# Patient Record
Sex: FEMALE | Race: WHITE | NOT HISPANIC OR LATINO | Employment: FULL TIME | URBAN - METROPOLITAN AREA
[De-identification: names, ages, dates, MRNs, and addresses within clinical notes are randomized per-mention and may not be internally consistent; named-entity substitution may affect disease eponyms.]

---

## 2017-07-11 ENCOUNTER — NURSE TRIAGE (OUTPATIENT)
Dept: NURSING | Facility: CLINIC | Age: 62
End: 2017-07-11

## 2017-07-11 ENCOUNTER — RADIANT APPOINTMENT (OUTPATIENT)
Dept: GENERAL RADIOLOGY | Facility: CLINIC | Age: 62
End: 2017-07-11
Attending: PHYSICIAN ASSISTANT
Payer: COMMERCIAL

## 2017-07-11 ENCOUNTER — OFFICE VISIT (OUTPATIENT)
Dept: URGENT CARE | Facility: URGENT CARE | Age: 62
End: 2017-07-11
Payer: COMMERCIAL

## 2017-07-11 VITALS
BODY MASS INDEX: 22.1 KG/M2 | HEART RATE: 92 BPM | TEMPERATURE: 97.2 F | SYSTOLIC BLOOD PRESSURE: 143 MMHG | DIASTOLIC BLOOD PRESSURE: 84 MMHG | WEIGHT: 139 LBS

## 2017-07-11 DIAGNOSIS — S99.922A INJURY OF LEFT FOOT, INITIAL ENCOUNTER: ICD-10-CM

## 2017-07-11 DIAGNOSIS — S92.515A CLOSED NONDISPLACED FRACTURE OF PROXIMAL PHALANX OF LESSER TOE OF LEFT FOOT, INITIAL ENCOUNTER: Primary | ICD-10-CM

## 2017-07-11 PROBLEM — J44.9 COPD (CHRONIC OBSTRUCTIVE PULMONARY DISEASE) (H): Status: ACTIVE | Noted: 2017-07-11

## 2017-07-11 PROCEDURE — 99203 OFFICE O/P NEW LOW 30 MIN: CPT | Performed by: PHYSICIAN ASSISTANT

## 2017-07-11 PROCEDURE — 73630 X-RAY EXAM OF FOOT: CPT | Mod: LT

## 2017-07-11 RX ORDER — NAPROXEN 500 MG/1
500 TABLET ORAL 2 TIMES DAILY PRN
Qty: 30 TABLET | Refills: 1 | Status: SHIPPED | OUTPATIENT
Start: 2017-07-11

## 2017-07-11 NOTE — PROGRESS NOTES
SUBJECTIVE:                                                      MURPHY Painting is a 61 year old female who presents to clinic today for the following health issues:  Musculoskeletal problem/pain    Duration: earlier today    Description  Location: left foot, 5th toe    Intensity:  moderate    Accompanying signs and symptoms: swelling and bruising.  No radicular pain, numbness, tingling or weakness.  No redness, drainage or fevers.      History  Previous similar problem: no   Previous evaluation:  none    Precipitating or alleviating factors:  Trauma or overuse: YES- foot got caught on some lawn edging today.    Aggravating factors include: standing and walking, pressure.  Relieved with rest.    Therapies tried and outcome: Ibuprofen with minimal relief.      Reviewed PMH, FMH and SOH.  Patient Active Problem List   Diagnosis     COPD (chronic obstructive pulmonary disease) (H)     Current Outpatient Prescriptions   Medication Sig Dispense Refill     Allergies   Allergen Reactions     New Medication Allergy      Unknown name. Anti depressent/anxiety       ROS  All other ROS are negative.    Physical Exam   Constitutional: She is oriented to person, place, and time and well-developed, well-nourished, and in no distress. No distress.   Musculoskeletal:        Left ankle: Normal. She exhibits normal range of motion, no swelling, no ecchymosis and no deformity. No tenderness. Achilles tendon normal.        Left foot: There is tenderness, bony tenderness (localized to the 5th MTP joint with ecchymosis. No bony stepoffs.) and swelling. There is normal range of motion, normal capillary refill, no crepitus, no deformity and no laceration.   Neurological: She is alert and oriented to person, place, and time. She has normal sensation, normal strength, normal reflexes and intact cranial nerves. Gait abnormal.   Skin: Skin is warm, dry and intact.   Distal pulses are 2+ and symmetric.  No peripheral edema.   Nursing  note and vitals reviewed.  3V of L foot:  Fracture of the proximal phalanx of the fifth toe.  There is deformity of the 5th metatarsal head from previous surgery.  No dislocations.  No soft tissue swelling or masses.  Will send for overread.        Assessment/Plan:   Closed nondisplaced fracture of proximal phalanx of lesser toe of left foot, initial encounter  Fracture of the 5th proximal phalanx present.  Toe was buddytaped to the adjacent toe and foot was placed in post op shoe.  Recommend RICE and will give naproxen prn pain.  Will also send to orthopedics for further evaluation and management.  Recheck in clinic if symptoms worsen or if symptoms do not improve.   -     XR Foot Left G/E 3 Views  -     ORTHO  REFERRAL  -     naproxen (NAPROSYN) 500 MG tablet; Take 1 tablet (500 mg) by mouth 2 times daily as needed for moderate pain (with food)    Injury of left foot, initial encounter      Jayna See BRANDEE Daniels

## 2017-07-11 NOTE — MR AVS SNAPSHOT
After Visit Summary   7/11/2017    Lorraine Painting    MRN: [id number]           Patient Information     Date Of Birth          1955        Visit Information        Provider Department      7/11/2017 6:25 PM Jayna Daniels PA-C [facility]        Today's Diagnoses     Injury of left foot, initial encounter    -  1       Follow-ups after your visit        Additional Services     ORTHO  REFERRAL       [facility] is referring you to the Orthopedic  Services at [facility].       The  Representative will assist you in the coordination of your Orthopedic and Musculoskeletal Care as prescribed by your physician.    The  Representative will call you within 1 business day to help schedule your appointment, or you may contact the  Representative at:    All areas ~ [phone]     Type of Referral : [facility]       Timeframe requested: 1 - 2 days    Coverage of these services is subject to the terms and limitations of your health insurance plan.  Please call member services at your health plan with any benefit or coverage questions.      If X-rays, CT or MRI's have been performed, please contact the facility where they were done to arrange for , prior to your scheduled appointment.  Please bring this referral request to your appointment and present it to your specialist.                  Who to contact     If you have questions or need follow up information about today's clinic visit or your schedule please contact [facility] directly at [phone].  Normal or non-critical lab and imaging results will be communicated to you by MyChart, letter or phone within 4 business days after the clinic has received the results. If you do not hear from us within 7 days, please contact the clinic through MyChart or phone. If you have a critical or abnormal lab result, we  "will notify you by phone as soon as possible.  Submit refill requests through [employer] or call your pharmacy and they will forward the refill request to us. Please allow 3 business days for your refill to be completed.          Additional Information About Your Visit        [employer]art Information     [employer] lets you send messages to your doctor, view your test results, renew your prescriptions, schedule appointments and more. To sign up, go to [email or web address] . Click on \"Log in\" on the left side of the screen, which will take you to the Welcome page. Then click on \"Sign up Now\" on the right side of the page.     You will be asked to enter the access code listed below, as well as some personal information. Please follow the directions to create your username and password.     Your access code is: [email or web address]  Expires: 10/9/2017  7:09 PM     Your access code will  in 90 days. If you need help or a new code, please call your [place] clinic or [phone].        Care EveryWhere ID     This is your Care EveryWhere ID. This could be used by other organizations to access your [place] medical records  [id number]        Your Vitals Were     Pulse Temperature BMI (Body Mass Index)             92 97.2  F (36.2  C) (Oral) 22.1 kg/m2          Blood Pressure from Last 3 Encounters:   17 143/84   11/29/10 137/89    Weight from Last 3 Encounters:   17 139 lb (63 kg)   11/29/10 124 lb 12.8 oz (56.6 kg)              We Performed the Following     ORTHO  REFERRAL     XR Foot Left G/E 3 Views          Today's Medication Changes          These changes are accurate as of: 17  7:09 PM.  If you have any questions, ask your nurse or doctor.               Start taking these medicines.        Dose/Directions    naproxen 500 MG tablet   Commonly known as:  NAPROSYN   Used for:  Injury of left foot, initial encounter   Started by:  Jayna Daniels PA-C        Dose:  500 mg   Take 1 tablet (500 mg) by " mouth 2 times daily as needed for moderate pain (with food)   Quantity:  30 tablet   Refills:  1            Where to get your medicines      These medications were sent to [facility] [address], [address]  [address] [address] [facility], [facility] [address], [facility] [address]     Phone:  [phone]     naproxen 500 MG tablet                Primary Care Provider    None Specified       No primary provider on file.        Equal Access to Services     [facility]: Hadii aad ku hadasho Soomaali, waaxda luqadaha, qaybta kaalmada adeegyada, waxay sanjayin haygwendolynn bladimir sandhukarrileonidas gomes . So [facility] [phone].    ATENCIÓN: Si habla español, tiene a stahl disposición servicios gratuitos de asistencia lingüística. Llame al [phone].    We comply with applicable federal civil rights laws and [place] laws. We do not discriminate on the basis of race, color, national origin, age, disability sex, sexual orientation or gender identity.            Thank you!     Thank you for choosing [facility]  for your care. Our goal is always to provide you with excellent care. Hearing back from our patients is one way we can continue to improve our services. Please take a few minutes to complete the written survey that you may receive in the mail after your visit with us. Thank you!             Your Updated Medication List - Protect others around you: Learn how to safely use, store and throw away your medicines at www.disposemymeds.org.          This list is accurate as of: 7/11/17  7:09 PM.  Always use your most recent med list.                   Brand Name Dispense Instructions for use Diagnosis    naproxen 500 MG tablet    NAPROSYN    30 tablet    Take 1 tablet (500 mg) by mouth 2 times daily as needed for moderate pain (with food)    Injury of left foot, initial encounter

## 2017-07-11 NOTE — NURSING NOTE
"Chief Complaint   Patient presents with     Toe Injury     left foot, pinky toe       Initial /84  Pulse 92  Temp 97.2  F (36.2  C) (Oral)  Wt 139 lb (63 kg)  BMI 22.1 kg/m2 Estimated body mass index is 22.1 kg/(m^2) as calculated from the following:    Height as of 11/29/10: 5' 6.5\" (1.689 m).    Weight as of this encounter: 139 lb (63 kg).  Medication Reconciliation: complete   Alyse Fabian CMA      "

## 2017-07-11 NOTE — TELEPHONE ENCOUNTER
"\"I was putting my garbage can back in it's parking spot today and I caught my little toe on my lawn edging (plastic).  I can't feel the pain sitting still but it is severe 10/10 with pressure, I can't walk on it.\" Pt states the toe is off to the side now, possibly broken.     Reason for Disposition    Looks like a broken bone (e.g., crooked or deformed)    Additional Information    Negative: [1] Major bleeding (e.g., spurting blood) AND [2] can't be stopped    Negative: Foot or ankle injury    Negative: Looks infected    Negative: Amputated toe    Negative: Skin is split open or gaping  (or length > 1/2 inch or 12 mm)    Negative: [1] Bleeding AND [2] won't stop after 10 minutes of direct pressure (using correct technique)    Negative: [1] Dirt in the wound AND [2] not removed with 15 minutes of scrubbing    Negative: Sounds like a serious injury to the triager    Negative: [1] SEVERE pain AND [2] not improved 2 hours after pain medicine/ice packs    Negative: [1] MODERATE-SEVERE pain AND [2] blood present under the toenail    Protocols used: TOE INJURY-ADULT-    "

## 2017-07-12 ENCOUNTER — OFFICE VISIT (OUTPATIENT)
Dept: PODIATRY | Facility: CLINIC | Age: 62
End: 2017-07-12
Payer: COMMERCIAL

## 2017-07-12 VITALS
SYSTOLIC BLOOD PRESSURE: 135 MMHG | WEIGHT: 139 LBS | BODY MASS INDEX: 21.82 KG/M2 | DIASTOLIC BLOOD PRESSURE: 80 MMHG | HEIGHT: 67 IN

## 2017-07-12 DIAGNOSIS — S92.515A CLOSED NONDISPLACED FRACTURE OF PROXIMAL PHALANX OF LESSER TOE OF LEFT FOOT, INITIAL ENCOUNTER: Primary | ICD-10-CM

## 2017-07-12 PROCEDURE — 99203 OFFICE O/P NEW LOW 30 MIN: CPT | Performed by: PODIATRIST

## 2017-07-12 NOTE — NURSING NOTE
"Chief Complaint   Patient presents with     Fracture     L 5th toe fracture DOI: 7/11/17       Initial /80 (BP Location: Right arm, Patient Position: Chair, Cuff Size: Adult Regular)  Ht 5' 6.5\" (1.689 m)  Wt 139 lb (63 kg)  BMI 22.1 kg/m2 Estimated body mass index is 22.1 kg/(m^2) as calculated from the following:    Height as of this encounter: 5' 6.5\" (1.689 m).    Weight as of this encounter: 139 lb (63 kg).  Medication Reconciliation: unable or not appropriate to perform    AEboni Pink MA July 12, 2017 11:53 AM  "

## 2017-07-12 NOTE — MR AVS SNAPSHOT
After Visit Summary   7/12/2017    Lorraine Painting    MRN: [id number]           Patient Information     Date Of Birth          1955        Visit Information        Provider Department      7/12/2017 11:40 AM Thang Do DPM [facility]        Care Instructions    Follow up in 2 weeks.  Weight bearing as tolerated in post-op shoe.  Dr. Do's Clinic Locations    Monday Tuesday  [facility]  [address]         [id number]  [place] [address]      [place] [address]  Ph:  [phone]      Ph: [phone]  Fax: [phone]      Fax: [phone]    Wednesday Thursday - Surgery Day  [facility] [place]     Call Liss @ [phone]  [address] [place] [address]  Ph: [phone]  Fax: [phone]      PRICE THERAPY    Many aches and pains throughout the foot and ankle can be helped with many simple treatments. This is usually described as PRICE Therapy.      P - Protection - often times, inflammation/pain in the lower extremity is not able to improve simply because the areas involved are never allowed to rest. Every step we take can bother the problematic area. Protecting those areas is an important step in the healing process. This may involve a walking cast boot, a special insert/orthotic device, an ankle brace, or simply avoiding barefoot walking.    R - Rest - in addition to protecting the foot/ankle, resting is an important, but often times difficult, treatment option. Getting off your feet when they bother you, and specifically avoiding activities that cause pain/discomfort, are very beneficial to prevent, and treat, foot/ankle pain.      I - Ice - icing regularly can help to decrease inflammation and swelling in the foot, thus decreasing pain. Using an ice pack or a bag of frozen veggies works very well. Ice for 20 minutes multiple times per day as needed.   Do not place the ice directly on the skin as this can cause tissue damage.    C - Compression - using a compression wrap or an ACE wrap can help to decrease swelling, which can help to decrease pain. Wearing the wraps is generally not needed at night, but they should be worn on a regular basis when you are going to be on your feet for prolonged periods as gravity tends to pull fluids down to your feet/ankles.    E - Elevation - elevating your lower extremities multiple times daily for 15-20 minutes can help to decrease swelling, which works well in decreasing pain levels.    NSAID/Tylenol - Anti-inflammatories like Aleve or ibuprofen, and/or a pain medication, such as Tylenol, can help to improve pain levels and get the issue resolved sooner rather than later. Anyone with liver issues should be careful with Tylenol, and anyone with high blood pressure or heart, stomach or kidney issues should be careful with anti-inflammatories. Please ask if you have questions about these medications, including dosage.      SMOKING CESSATION    What's in cigarette smoke? - Cigarette smoke contains over 4,000 chemicals. Nicotine is one of the main ingredients which is an insecticide/herbicide. It is poisonous to our nervous system, increases blood clotting risk, and decreases the body's defenses to fight off infection. Another chemical is Carbon Monoxide is an asphyxiating gas that permanently binds to blood cells and blocks the transport of oxygen. This leads to tissue death and decreases your metabolism. Tar is a chemical that coats your lungs and trachea which impairs new oxygen coming in and carbon dioxide getting out of your body.   How does smoking impact surgery? - Smoking is particularly hazardous with regards to surgery. Surgery puts stress on the body and a smoker's body is already under strain from these chemicals. Putting the two together, especially for an elective surgery, could be a recipe for disaster. Smoking before  and after surgery increases your risk of heart problems, slow wound healing, delayed bone healing, blood clots, wound infection and anesthesia complications.   What are the benefits of quitting? - In 20 minutes your blood pressure will drop back down to normal. In 8 hours the carbon monoxide (a toxic gas) levels in your blood stream will drop by half, and oxygen levels will return to normal. In 48 hours your chance of having a heart attack will have decreased. All nicotine will have left your body. Your sense of taste and smell will return to a normal level. In 72 hours your bronchial tubes will relax, and your energy levels will increase. In 2 weeks your circulation will increase, and it will continue to improve for the next 10 weeks.    Recommendations for elective surgery - Ideally, patients should quit smoking 8 weeks before and at least 2 weeks after elective surgery in order to avoid complications. Simply cutting back on the amount of cigarettes smoked per day does not offer any benefit or decrease the risk of poor wound healing, heart problems, and infection. Smokers should also start taking Vitamin C and B for two weeks before surgery and two weeks after surgery.    Ways to Stop Smokin. Nicotine patches, lozenges, or gum  2. Support Groups  3. Medications (see below)    List of Medications:  1. Varenicline Tartrate (CHANTIX)   2. Bupropion HCL (WELLBUTRIN, ZYBAN) - note: make sure Wellbutrin is for smoking cessation and not other issues   3. Nicotine Patch (NICODERM)   4. Nicotine Inhaler (NICOTROL)   5. Nicotine Gum Nicotine Polacrilex   6. Nicotine Lozenge: Nicotine Polacrilex (COMMIT)   * [facility] offers a smoking support group as well!  Please visit: [email or web address]  If you are interested in these, ask about getting a prescription or talk to your primary care doctor about what may be the best way for you to quit.                 Follow-ups after your visit        Who to  "contact     If you have questions or need follow up information about today's clinic visit or your schedule please contact [facility] directly at [phone].  Normal or non-critical lab and imaging results will be communicated to you by MyChart, letter or phone within 4 business days after the clinic has received the results. If you do not hear from us within 7 days, please contact the clinic through MyChart or phone. If you have a critical or abnormal lab result, we will notify you by phone as soon as possible.  Submit refill requests through [employer] or call your pharmacy and they will forward the refill request to us. Please allow 3 business days for your refill to be completed.          Additional Information About Your Visit        [employer] Information     [employer] lets you send messages to your doctor, view your test results, renew your prescriptions, schedule appointments and more. To sign up, go to [email or web address] . Click on \"Log in\" on the left side of the screen, which will take you to the Welcome page. Then click on \"Sign up Now\" on the right side of the page.     You will be asked to enter the access code listed below, as well as some personal information. Please follow the directions to create your username and password.     Your access code is: [email or web address]  Expires: 10/9/2017  7:09 PM     Your access code will  in 90 days. If you need help or a new code, please call your [place] clinic or [phone].        Care EveryWhere ID     This is your Care EveryWhere ID. This could be used by other organizations to access your [place] medical records  [id number]        Your Vitals Were     Height BMI (Body Mass Index)                5' 6.5\" (1.689 m) 22.1 kg/m2           Blood Pressure from Last 3 Encounters:   17 135/80   17 143/84   11/29/10 137/89    Weight from Last 3 Encounters:   17 139 lb (63 kg)   17 139 lb (63 kg)   11/29/10 124 lb 12.8 oz " (56.6 kg)              Today, you had the following     No orders found for display       Primary Care Provider    None Specified       No primary provider on file.        Equal Access to Services     [facility] [facility] : Hadii aad ku hadalliekhushi Royer, juanyda hayleybismark, leanna kakamarda geovani, nguyen nugent taylorsonny theodore lagenesislulu shyann. So [facility] [phone].    ATENCIÓN: Si habla español, tiene a stahl disposición servicios gratuitos de asistencia lingüística. Llame al [phone].    We comply with applicable federal civil rights laws and [place] laws. We do not discriminate on the basis of race, color, national origin, age, disability sex, sexual orientation or gender identity.            Thank you!     Thank you for choosing [facility]  for your care. Our goal is always to provide you with excellent care. Hearing back from our patients is one way we can continue to improve our services. Please take a few minutes to complete the written survey that you may receive in the mail after your visit with us. Thank you!             Your Updated Medication List - Protect others around you: Learn how to safely use, store and throw away your medicines at www.disposemymeds.org.          This list is accurate as of: 7/12/17 12:04 PM.  Always use your most recent med list.                   Brand Name Dispense Instructions for use Diagnosis    naproxen 500 MG tablet    NAPROSYN    30 tablet    Take 1 tablet (500 mg) by mouth 2 times daily as needed for moderate pain (with food)    Closed nondisplaced fracture of proximal phalanx of lesser toe of left foot, initial encounter

## 2017-07-12 NOTE — PROGRESS NOTES
"PATIENT HISTORY:  Lorraine Painting is a 61 year old female who presents to clinic for a left 5th toe fx.  Seen at  yesterday.  Injury yesterday.  Toe caught on lawn edging.  Pt is in post po shoe with buddy tape.  Pain is 0-4/10, worse with pressure on the toe.       Review of Systems:  Patient denies fever, chills, rash, wound, stiffness, limping, numbness, weakness, heart burn, blood in stool, chest pain with activity, calf pain when walking, shortness of breath with activity, chronic cough, easy bleeding/bruising, swelling of ankles, excessive thirst, fatigue, depression, anxiety.       PAST MEDICAL HISTORY: Denies     PAST SURGICAL HISTORY:   Past Surgical History:   Procedure Laterality Date     FOOT SURGERY Left         MEDICATIONS:   Current Outpatient Prescriptions:      naproxen (NAPROSYN) 500 MG tablet, Take 1 tablet (500 mg) by mouth 2 times daily as needed for moderate pain (with food) (Patient not taking: Reported on 7/12/2017), Disp: 30 tablet, Rfl: 1     ALLERGIES:    Allergies   Allergen Reactions     New Medication Allergy      Unknown name. Anti depressent/anxiety     Venlafaxine Other (See Comments)        SOCIAL HISTORY:   Social History     Social History     Marital status:      Spouse name: N/A     Number of children: N/A     Years of education: N/A     Occupational History     Not on file.     Social History Main Topics     Smoking status: Current Every Day Smoker     Packs/day: 1.00     Years: 30.00     Types: Cigarettes     Smokeless tobacco: Not on file     Alcohol use No     Drug use: Yes      Comment: marijuana as a teen      Sexual activity: Not Currently     Other Topics Concern     Not on file     Social History Narrative        FAMILY HISTORY: No family history on file.     EXAM:Vitals: /80 (BP Location: Right arm, Patient Position: Chair, Cuff Size: Adult Regular)  Ht 5' 6.5\" (1.689 m)  Wt 139 lb (63 kg)  BMI 22.1 kg/m2  BMI= Body mass index is 22.1 " kg/(m^2).    General appearance: Patient is alert and fully cooperative with history & exam.  No sign of distress is noted during the visit.     Psychiatric: Affect is pleasant & appropriate.  Patient appears motivated to improve health.     Respiratory: Breathing is regular & unlabored while sitting.     HEENT: Hearing is intact to spoken word.  Speech is clear.  No gross evidence of visual impairment that would impact ambulation.     Dermatologic: Skin is intact to left foot.  L 5th toe bruising, edema.  No paronychia or evidence of soft tissue infection is noted.     Vascular: DP & PT pulses are intact & regular on the left.  CFT and skin temperature are normal.     Neurologic: Lower extremity sensation is intact to light touch.  No evidence of weakness or contracture in the lower extremities.  No evidence of neuropathy.     Musculoskeletal: L 5th toe pain.  Luh taped to 4th.  Appears well aligned.  Patient is ambulatory.  No gross ankle deformity noted.  No foot or ankle joint effusion is noted.    XRs of left foot reviewed with pt.  Mildly laterally angulated fx of 5th proximal phalangeal head.     ASSESSMENT: L 5th toe fracture     PLAN:  Reviewed patient's chart in epic.  Discussed condition and treatment options including pros and cons.    Treatment options for the fracture were discussed.  Non-operative treatment would involve a period of immobilization, rest, bracing or casting and edema control.  There is potential for the fracture to heal without surgery yet there may still be a need for delayed surgery.  There is potential for non-union with any fracture    A decision was made to pursue nonoperative care based on patient preference, fracture pattern.  Discussed pinning the toe is an option.  Reasonable to continue splinting/luh taping.  WBAT in post op shoe or equivalent stiff soled boot advised.        Smoking cessation advised.    F/u in 2 wks.       Thang Do, ENEIDA, FACFAS

## 2017-07-12 NOTE — PATIENT INSTRUCTIONS
Follow up in 2 weeks.  Weight bearing as tolerated in post-op shoe.  Dr. Do's Clinic Locations    Monday Tuesday  [facility]  [address]         [id number]  [place] [address]      [place] [address]  Ph:  [phone]      Ph: [phone]  Fax: [phone]      Fax: [phone]    Wednesday Thursday - Surgery Day  [facility]     Call Liss @ [phone]  [address] [place] [address]        [place] [address]  Ph: [phone]  Fax: [phone]      PRICE THERAPY    Many aches and pains throughout the foot and ankle can be helped with many simple treatments. This is usually described as PRICE Therapy.      P - Protection - often times, inflammation/pain in the lower extremity is not able to improve simply because the areas involved are never allowed to rest. Every step we take can bother the problematic area. Protecting those areas is an important step in the healing process. This may involve a walking cast boot, a special insert/orthotic device, an ankle brace, or simply avoiding barefoot walking.    R - Rest - in addition to protecting the foot/ankle, resting is an important, but often times difficult, treatment option. Getting off your feet when they bother you, and specifically avoiding activities that cause pain/discomfort, are very beneficial to prevent, and treat, foot/ankle pain.      I - Ice - icing regularly can help to decrease inflammation and swelling in the foot, thus decreasing pain. Using an ice pack or a bag of frozen veggies works very well. Ice for 20 minutes multiple times per day as needed.  Do not place the ice directly on the skin as this can cause tissue damage.    C - Compression - using a compression wrap or an ACE wrap can help to decrease swelling, which can help to decrease pain. Wearing the wraps is generally not needed at night, but they should be worn on a regular basis when you are  going to be on your feet for prolonged periods as gravity tends to pull fluids down to your feet/ankles.    E - Elevation - elevating your lower extremities multiple times daily for 15-20 minutes can help to decrease swelling, which works well in decreasing pain levels.    NSAID/Tylenol - Anti-inflammatories like Aleve or ibuprofen, and/or a pain medication, such as Tylenol, can help to improve pain levels and get the issue resolved sooner rather than later. Anyone with liver issues should be careful with Tylenol, and anyone with high blood pressure or heart, stomach or kidney issues should be careful with anti-inflammatories. Please ask if you have questions about these medications, including dosage.      SMOKING CESSATION    What's in cigarette smoke? - Cigarette smoke contains over 4,000 chemicals. Nicotine is one of the main ingredients which is an insecticide/herbicide. It is poisonous to our nervous system, increases blood clotting risk, and decreases the body's defenses to fight off infection. Another chemical is Carbon Monoxide is an asphyxiating gas that permanently binds to blood cells and blocks the transport of oxygen. This leads to tissue death and decreases your metabolism. Tar is a chemical that coats your lungs and trachea which impairs new oxygen coming in and carbon dioxide getting out of your body.   How does smoking impact surgery? - Smoking is particularly hazardous with regards to surgery. Surgery puts stress on the body and a smoker's body is already under strain from these chemicals. Putting the two together, especially for an elective surgery, could be a recipe for disaster. Smoking before and after surgery increases your risk of heart problems, slow wound healing, delayed bone healing, blood clots, wound infection and anesthesia complications.   What are the benefits of quitting? - In 20 minutes your blood pressure will drop back down to normal. In 8 hours the carbon monoxide (a toxic gas)  levels in your blood stream will drop by half, and oxygen levels will return to normal. In 48 hours your chance of having a heart attack will have decreased. All nicotine will have left your body. Your sense of taste and smell will return to a normal level. In 72 hours your bronchial tubes will relax, and your energy levels will increase. In 2 weeks your circulation will increase, and it will continue to improve for the next 10 weeks.    Recommendations for elective surgery - Ideally, patients should quit smoking 8 weeks before and at least 2 weeks after elective surgery in order to avoid complications. Simply cutting back on the amount of cigarettes smoked per day does not offer any benefit or decrease the risk of poor wound healing, heart problems, and infection. Smokers should also start taking Vitamin C and B for two weeks before surgery and two weeks after surgery.    Ways to Stop Smokin. Nicotine patches, lozenges, or gum  2. Support Groups  3. Medications (see below)    List of Medications:  1. Varenicline Tartrate (CHANTIX)   2. Bupropion HCL (WELLBUTRIN, ZYBAN) - note: make sure Wellbutrin is for smoking cessation and not other issues   3. Nicotine Patch (NICODERM)   4. Nicotine Inhaler (NICOTROL)   5. Nicotine Gum Nicotine Polacrilex   6. Nicotine Lozenge: Nicotine Polacrilex (COMMIT)   * [facility] offers a smoking support group as well!  Please visit: [email or web address]  If you are interested in these, ask about getting a prescription or talk to your primary care doctor about what may be the best way for you to quit.

## 2017-07-13 ENCOUNTER — TELEPHONE (OUTPATIENT)
Dept: FAMILY MEDICINE | Facility: CLINIC | Age: 62
End: 2017-07-13

## 2017-07-13 NOTE — TELEPHONE ENCOUNTER
Patient picking up letter written by provider, ID verified and letter handed to patient.    . Larissa Peck  Patient Representative

## 2017-07-13 NOTE — LETTER
[address] [address]  Phone: [phone]  Fax: [phone]    July 13, 2017        Lorraine WHYTE Law  [address]  [place] [address]          To whom it may concern:    RE: Lorraine A Law    Due to injury, please excuse Lorraine from work until her follow up appointment on 7/26/17.    Please contact me for questions or concerns.      Sincerely,        Thang Do DPM

## 2017-07-13 NOTE — TELEPHONE ENCOUNTER
"Called pt and she said she would like to take off \"whatever amount of time recommended for proper healing\". She works 3 12 hours shifts in a row per week. Please fax letter to [facility]: [phone] for her to .  Please call her and let her know when it is faxed.            "

## 2017-07-13 NOTE — TELEPHONE ENCOUNTER
Reason for Call:  Other Letter for work    Detailed comments: The patient called in to the clinic stating she needs Dr. Do to write her employer a letter stating the patient needs to be out of work for a certain amount of time.    Phone Number Patient can be reached at: Home number on file [phone] (home)    Best Time: any    Can we leave a detailed message on this number? YES    Call taken on 7/13/2017 at 12:47 PM by Larissa Peck

## 2017-07-18 ENCOUNTER — TELEPHONE (OUTPATIENT)
Dept: FAMILY MEDICINE | Facility: CLINIC | Age: 62
End: 2017-07-18

## 2017-07-18 NOTE — TELEPHONE ENCOUNTER
Reason for Call:  Form, our goal is to have forms completed with 72 hours, however, some forms may require a visit or additional information.    Type of letter, form or note:  disability    Who is the form from?: Short term disability (if other please explain)    Where did the form come from: Patient or family brought in       What clinic location was the form placed at?: [facility])    Where the form was placed: 's Box    What number is listed as a contact on the form?: [phone]       Additional comments: patient would like form faxed to [facility] at [phone] then please mail a copy to patients home address.    Call taken on 7/18/2017 at 10:40 AM by Larissa Peck

## 2017-07-19 NOTE — TELEPHONE ENCOUNTER
Please check to see how much time she would like to take off.  I did give her a letter that would cover her until next Wednesday.  I would normally write the paperwork for 4-6 weeks for a fracture, and she may be able to go back sooner depending on her progress.  Is this ok?    Also, when was the first day she took off?

## 2017-07-19 NOTE — TELEPHONE ENCOUNTER
Informed pt of message below. She stated that she is on her feet for 12 hours a day at work and that she would like to proceed with the paperwork. The first day she took off was 7/14/17. Informed patient that I will fax forms today and also mail a copy to her. Pt had no further questions.    .NAZANIN Pink MA July 19, 2017 10:39 AM

## 2017-07-26 ENCOUNTER — OFFICE VISIT (OUTPATIENT)
Dept: PODIATRY | Facility: CLINIC | Age: 62
End: 2017-07-26
Payer: COMMERCIAL

## 2017-07-26 ENCOUNTER — RADIANT APPOINTMENT (OUTPATIENT)
Dept: GENERAL RADIOLOGY | Facility: CLINIC | Age: 62
End: 2017-07-26
Attending: PODIATRIST
Payer: COMMERCIAL

## 2017-07-26 VITALS
SYSTOLIC BLOOD PRESSURE: 128 MMHG | HEIGHT: 67 IN | WEIGHT: 139 LBS | DIASTOLIC BLOOD PRESSURE: 82 MMHG | BODY MASS INDEX: 21.82 KG/M2

## 2017-07-26 DIAGNOSIS — S92.515D CLOSED NONDISPLACED FRACTURE OF PROXIMAL PHALANX OF LESSER TOE OF LEFT FOOT WITH ROUTINE HEALING, SUBSEQUENT ENCOUNTER: ICD-10-CM

## 2017-07-26 DIAGNOSIS — S92.515D CLOSED NONDISPLACED FRACTURE OF PROXIMAL PHALANX OF LESSER TOE OF LEFT FOOT WITH ROUTINE HEALING, SUBSEQUENT ENCOUNTER: Primary | ICD-10-CM

## 2017-07-26 PROCEDURE — 73660 X-RAY EXAM OF TOE(S): CPT | Mod: LT

## 2017-07-26 PROCEDURE — 99213 OFFICE O/P EST LOW 20 MIN: CPT | Performed by: PODIATRIST

## 2017-07-26 RX ORDER — VARENICLINE TARTRATE 1 MG/1
TABLET, FILM COATED ORAL
Refills: 0 | COMMUNITY
Start: 2016-10-13 | End: 2022-01-01

## 2017-07-26 NOTE — NURSING NOTE
"Chief Complaint   Patient presents with     Fracture     Follow up L 5th toe Fx       Initial /82 (BP Location: Right arm, Patient Position: Chair, Cuff Size: Adult Regular)  Ht 5' 6.5\" (1.689 m)  Wt 139 lb (63 kg)  BMI 22.1 kg/m2 Estimated body mass index is 22.1 kg/(m^2) as calculated from the following:    Height as of this encounter: 5' 6.5\" (1.689 m).    Weight as of this encounter: 139 lb (63 kg).  Medication Reconciliation: unable or not appropriate to perform    A. LOUIE Pink July 26, 2017 9:08 AM  "

## 2017-07-26 NOTE — PROGRESS NOTES
"PATIENT HISTORY:  Lorraine Painting is a 61 year old female who presents to clinic for recheck of a right 5th toe fx.  About 2 wk duration.  Pt is WBAT in post op shoe.  Denies pain today, but can be painful with walking.  She is off work as she cannot wear post op shoe at work.  Denies fever, injury.  Smoker.  Denies related family hx.       EXAM:/82 (BP Location: Right arm, Patient Position: Chair, Cuff Size: Adult Regular)  Ht 5' 6.5\" (1.689 m)  Wt 139 lb (63 kg)  BMI 22.1 kg/m2    General appearance: Patient is alert and fully cooperative with history & exam.  No sign of distress is noted during the visit.     Dermatologic: Skin is intact to left foot.  No paronychia or evidence of soft tissue infection is noted.      Vascular: DP & PT pulses are intact & regular on the left.  CFT and skin temperature are normal.      Neurologic: Lower extremity sensation is intact to light touch.  No evidence of weakness or contracture in the lower extremities.  No evidence of neuropathy.      Musculoskeletal: L 5th toe pain. Appears well aligned.  Patient is ambulatory.  No gross ankle deformity noted.  No foot or ankle joint effusion is noted.     XRs of left foot reviewed with pt.  fx of 5th proximal phalangeal head w/ improved alignment, signs of healing.      ASSESSMENT: L 5th toe fracture      PLAN:  Reviewed patient's chart in epic.  Discussed condition and treatment options including pros and cons.     Continue current treatment.  WBAT in post op shoe.  Darnell tape.  RICE.     Smoking cessation advised.     F/u in 4 wks.         Thang Do DPM, FACFAS        "

## 2017-07-26 NOTE — MR AVS SNAPSHOT
"              After Visit Summary   7/26/2017    Lorraine Painting    MRN: [id number]           Patient Information     Date Of Birth          1955        Visit Information        Provider Department      7/26/2017 9:00 AM Thang Do DPM [facility]        Today's Diagnoses     Closed nondisplaced fracture of proximal phalanx of lesser toe of left foot with routine healing, subsequent encounter    -  1       Follow-ups after your visit        Your next 10 appointments already scheduled     Aug 23, 2017  9:00 AM CDT   Return Visit with Thang Do DPM   [facility] ([facility])    [address] [address] [phone]              Who to contact     If you have questions or need follow up information about today's clinic visit or your schedule please contact [facility] directly at [phone].  Normal or non-critical lab and imaging results will be communicated to you by MyChart, letter or phone within 4 business days after the clinic has received the results. If you do not hear from us within 7 days, please contact the clinic through MyChart or phone. If you have a critical or abnormal lab result, we will notify you by phone as soon as possible.  Submit refill requests through [facility] or call your pharmacy and they will forward the refill request to us. Please allow 3 business days for your refill to be completed.          Additional Information About Your Visit        "[employer]hart Information     [facility] lets you send messages to your doctor, view your test results, renew your prescriptions, schedule appointments and more. To sign up, go to [email or web address] . Click on \"Log in\" on the left side of the screen, which will take you to the Welcome page. Then click on \"Sign up Now\" on the right side of the page.     You will be asked to enter the access code listed below, as well as some personal " "information. Please follow the directions to create your username and password.     Your access code is: [email or web address]  Expires: 10/9/2017  7:09 PM     Your access code will  in 90 days. If you need help or a new code, please call your [place] clinic or [phone].        Care EveryWhere ID     This is your Care EveryWhere ID. This could be used by other organizations to access your [place] medical records  [id number]        Your Vitals Were     Height BMI (Body Mass Index)                5' 6.5\" (1.689 m) 22.1 kg/m2           Blood Pressure from Last 3 Encounters:   17 128/82   17 135/80   17 143/84    Weight from Last 3 Encounters:   17 139 lb (63 kg)   17 139 lb (63 kg)   17 139 lb (63 kg)               Primary Care Provider    None Specified       No primary provider on file.        Equal Access to Services     [facility] : Hadii adriane ku hadasho Soomaali, waaxda luqadaha, qaybta kaalmada adeegyada, waxay sanjayin raffi gomes . So [facility] [phone].    ATENCIÓN: Si habla español, tiene a stahl disposición servicios gratuitos de asistencia lingüística. Llame al [phone].    We comply with applicable federal civil rights laws and [place] laws. We do not discriminate on the basis of race, color, national origin, age, disability sex, sexual orientation or gender identity.            Thank you!     Thank you for choosing [facility]  for your care. Our goal is always to provide you with excellent care. Hearing back from our patients is one way we can continue to improve our services. Please take a few minutes to complete the written survey that you may receive in the mail after your visit with us. Thank you!             Your Updated Medication List - Protect others around you: Learn how to safely use, store and throw away your medicines at www.disposemymeds.org.          This list is accurate as of: 17  9:43 AM.  Always use your " most recent med list.                   Brand Name Dispense Instructions for use Diagnosis    CHANTIX 1 MG tablet   Generic drug:  varenicline      TK 1 T PO BID        naproxen 500 MG tablet    NAPROSYN    30 tablet    Take 1 tablet (500 mg) by mouth 2 times daily as needed for moderate pain (with food)    Closed nondisplaced fracture of proximal phalanx of lesser toe of left foot, initial encounter

## 2017-08-07 ENCOUNTER — TELEPHONE (OUTPATIENT)
Dept: FAMILY MEDICINE | Facility: CLINIC | Age: 62
End: 2017-08-07

## 2017-08-07 NOTE — TELEPHONE ENCOUNTER
Spoke with Adelina at [place] and informed her of message below. She verbalizing understanding and had no further questions.    NAZANIN Pink MA August 7, 2017 1:41 PM

## 2017-08-07 NOTE — TELEPHONE ENCOUNTER
Adelina called from [employer] with some questions regarding what is holding patient back from returning to work:    What limitations are preventing patient to return to work    What were the exam findings from 7/26/17 visit that are extending time off of work?  (Adelina does have a copy of office note too)    What change in treatment that was made?    Ok to leave detailed message [phone]    Please advise

## 2017-08-23 ENCOUNTER — OFFICE VISIT (OUTPATIENT)
Dept: PODIATRY | Facility: CLINIC | Age: 62
End: 2017-08-23
Payer: COMMERCIAL

## 2017-08-23 ENCOUNTER — RADIANT APPOINTMENT (OUTPATIENT)
Dept: GENERAL RADIOLOGY | Facility: CLINIC | Age: 62
End: 2017-08-23
Attending: PODIATRIST
Payer: COMMERCIAL

## 2017-08-23 VITALS — HEIGHT: 67 IN

## 2017-08-23 DIAGNOSIS — S92.515D CLOSED NONDISPLACED FRACTURE OF PROXIMAL PHALANX OF LESSER TOE OF LEFT FOOT WITH ROUTINE HEALING, SUBSEQUENT ENCOUNTER: ICD-10-CM

## 2017-08-23 DIAGNOSIS — S92.515D CLOSED NONDISPLACED FRACTURE OF PROXIMAL PHALANX OF LESSER TOE OF LEFT FOOT WITH ROUTINE HEALING, SUBSEQUENT ENCOUNTER: Primary | ICD-10-CM

## 2017-08-23 PROCEDURE — 73660 X-RAY EXAM OF TOE(S): CPT | Mod: LT

## 2017-08-23 PROCEDURE — 99213 OFFICE O/P EST LOW 20 MIN: CPT | Performed by: PODIATRIST

## 2017-08-23 NOTE — PATIENT INSTRUCTIONS
SMOKING CESSATION    What's in cigarette smoke? - Cigarette smoke contains over 4,000 chemicals. Nicotine is one of the main ingredients which is an insecticide/herbicide. It is poisonous to our nervous system, increases blood clotting risk, and decreases the body's defenses to fight off infection. Another chemical is Carbon Monoxide is an asphyxiating gas that permanently binds to blood cells and blocks the transport of oxygen. This leads to tissue death and decreases your metabolism. Tar is a chemical that coats your lungs and trachea which impairs new oxygen coming in and carbon dioxide getting out of your body.   How does smoking impact surgery? - Smoking is particularly hazardous with regards to surgery. Surgery puts stress on the body and a smoker's body is already under strain from these chemicals. Putting the two together, especially for an elective surgery, could be a recipe for disaster. Smoking before and after surgery increases your risk of heart problems, slow wound healing, delayed bone healing, blood clots, wound infection and anesthesia complications.   What are the benefits of quitting? - In 20 minutes your blood pressure will drop back down to normal. In 8 hours the carbon monoxide (a toxic gas) levels in your blood stream will drop by half, and oxygen levels will return to normal. In 48 hours your chance of having a heart attack will have decreased. All nicotine will have left your body. Your sense of taste and smell will return to a normal level. In 72 hours your bronchial tubes will relax, and your energy levels will increase. In 2 weeks your circulation will increase, and it will continue to improve for the next 10 weeks.    Recommendations for elective surgery - Ideally, patients should quit smoking 8 weeks before and at least 2 weeks after elective surgery in order to avoid complications. Simply cutting back on the amount of cigarettes smoked per day does not offer any benefit or decrease the  risk of poor wound healing, heart problems, and infection. Smokers should also start taking Vitamin C and B for two weeks before surgery and two weeks after surgery.    Ways to Stop Smokin. Nicotine patches, lozenges, or gum  2. Support Groups  3. Medications (see below)    List of Medications:  1. Varenicline Tartrate (CHANTIX)   2. Bupropion HCL (WELLBUTRIN, ZYBAN) - note: make sure Wellbutrin is for smoking cessation and not other issues   3. Nicotine Patch (NICODERM)   4. Nicotine Inhaler (NICOTROL)   5. Nicotine Gum Nicotine Polacrilex   6. Nicotine Lozenge: Nicotine Polacrilex (COMMIT)   * [facility] offers a smoking support group as well!  Please visit: [email or web address]  If you are interested in these, ask about getting a prescription or talk to your primary care doctor about what may be the best way for you to quit.

## 2017-08-23 NOTE — PROGRESS NOTES
PATIENT HISTORY:  Lorraine Painting is a 61 year old female who presents to clinic for recheck of a right 5th toe fx.  No pain.  Pt is WBAT in post op shoe.  Denies fever, injury.  Smoker.  Denies related family hx.       EXAM:  General appearance: Patient is alert and fully cooperative with history & exam.  No sign of distress is noted during the visit.     Dermatologic: Skin is intact to left foot.  No paronychia or evidence of soft tissue infection is noted.      Vascular: DP & PT pulses are intact & regular on the left.  CFT and skin temperature are normal.      Neurologic: Lower extremity sensation is intact to light touch.  No evidence of weakness or contracture in the lower extremities.  No evidence of neuropathy.      Musculoskeletal: No L 5th toe pain w/palpation. Appears well aligned.  Patient is ambulatory.  No gross ankle deformity noted.  No foot or ankle joint effusion is noted.     XRs of left foot reviewed with pt.  fx of 5th proximal phalangeal head w/good alignment, signs of healing.      ASSESSMENT: L 5th toe fracture      PLAN:  Reviewed patient's chart in epic.  Discussed condition and treatment options including pros and cons.     Transition to supportive shoes as tolerated.  May return to work w/o restriction.  F/u prn.     Thang Do, ENEIDA, FACFAS

## 2017-08-23 NOTE — MR AVS SNAPSHOT
After Visit Summary   8/23/2017    Lorraine Painting    MRN: [id number]           Patient Information     Date Of Birth          1955        Visit Information        Provider Department      8/23/2017 9:00 AM Thang Do DPM [facility]        Today's Diagnoses     Closed nondisplaced fracture of proximal phalanx of lesser toe of left foot with routine healing, subsequent encounter    -  1      Care Instructions    SMOKING CESSATION    What's in cigarette smoke? - Cigarette smoke contains over 4,000 chemicals. Nicotine is one of the main ingredients which is an insecticide/herbicide. It is poisonous to our nervous system, increases blood clotting risk, and decreases the body's defenses to fight off infection. Another chemical is Carbon Monoxide is an asphyxiating gas that permanently binds to blood cells and blocks the transport of oxygen. This leads to tissue death and decreases your metabolism. Tar is a chemical that coats your lungs and trachea which impairs new oxygen coming in and carbon dioxide getting out of your body.   How does smoking impact surgery? - Smoking is particularly hazardous with regards to surgery. Surgery puts stress on the body and a smoker's body is already under strain from these chemicals. Putting the two together, especially for an elective surgery, could be a recipe for disaster. Smoking before and after surgery increases your risk of heart problems, slow wound healing, delayed bone healing, blood clots, wound infection and anesthesia complications.   What are the benefits of quitting? - In 20 minutes your blood pressure will drop back down to normal. In 8 hours the carbon monoxide (a toxic gas) levels in your blood stream will drop by half, and oxygen levels will return to normal. In 48 hours your chance of having a heart attack will have decreased. All nicotine will have left your body. Your sense of taste and smell will return to a normal  level. In 72 hours your bronchial tubes will relax, and your energy levels will increase. In 2 weeks your circulation will increase, and it will continue to improve for the next 10 weeks.    Recommendations for elective surgery - Ideally, patients should quit smoking 8 weeks before and at least 2 weeks after elective surgery in order to avoid complications. Simply cutting back on the amount of cigarettes smoked per day does not offer any benefit or decrease the risk of poor wound healing, heart problems, and infection. Smokers should also start taking Vitamin C and B for two weeks before surgery and two weeks after surgery.    Ways to Stop Smokin. Nicotine patches, lozenges, or gum  2. Support Groups  3. Medications (see below)    List of Medications:  1. Varenicline Tartrate (CHANTIX)   2. Bupropion HCL (WELLBUTRIN, ZYBAN) - note: make sure Wellbutrin is for smoking cessation and not other issues   3. Nicotine Patch (NICODERM)   4. Nicotine Inhaler (NICOTROL)   5. Nicotine Gum Nicotine Polacrilex   6. Nicotine Lozenge: Nicotine Polacrilex (COMMIT)   * [facility] offers a smoking support group as well!  Please visit: [email or web address]  If you are interested in these, ask about getting a prescription or talk to your primary care doctor about what may be the best way for you to quit.                 Follow-ups after your visit        Who to contact     If you have questions or need follow up information about today's clinic visit or your schedule please contact [facility] directly at [phone].  Normal or non-critical lab and imaging results will be communicated to you by MyChart, letter or phone within 4 business days after the clinic has received the results. If you do not hear from us within 7 days, please contact the clinic through MyChart or phone. If you have a critical or abnormal lab result, we will notify you by phone as soon as possible.  Submit refill requests  "through [employer] or call your pharmacy and they will forward the refill request to us. Please allow 3 business days for your refill to be completed.          Additional Information About Your Visit        Tele[employer]hart Information     [employer] lets you send messages to your doctor, view your test results, renew your prescriptions, schedule appointments and more. To sign up, go to [email or web address] . Click on \"Log in\" on the left side of the screen, which will take you to the Welcome page. Then click on \"Sign up Now\" on the right side of the page.     You will be asked to enter the access code listed below, as well as some personal information. Please follow the directions to create your username and password.     Your access code is: [email or web address]  Expires: 10/9/2017  7:09 PM     Your access code will  in 90 days. If you need help or a new code, please call your [place] clinic or [phone].        Care EveryWhere ID     This is your Care EveryWhere ID. This could be used by other organizations to access your [facility] medical records  [id number]         Blood Pressure from Last 3 Encounters:   17 128/82   17 135/80   17 143/84    Weight from Last 3 Encounters:   17 139 lb (63 kg)   17 139 lb (63 kg)   17 139 lb (63 kg)               Primary Care Provider    None Specified       No primary provider on file.        Equal Access to Services     [facility] : Hadii adriane Linton, wasbda sebastian, qaybta kaalmada geovani, nguyen gomes . So [facility] [phone].    ATENCIÓN: Si habla español, tiene a stahl disposición servicios gratuitos de asistencia lingüística. Llame al [phone].    We comply with applicable federal civil rights laws and [place] laws. We do not discriminate on the basis of race, color, national origin, age, disability sex, sexual orientation or gender identity.            Thank you!     Thank you for choosing [facility]  for your care. Our goal is always to provide you with excellent care. Hearing back from our patients is one way we can continue to improve our services. Please take a few minutes to complete the written survey that you may receive in the mail after your visit with us. Thank you!             Your Updated Medication List - Protect others around you: Learn how to safely use, store and throw away your medicines at www.disposemymeds.org.          This list is accurate as of: 8/23/17  9:13 AM.  Always use your most recent med list.                   Brand Name Dispense Instructions for use Diagnosis    CHANTIX 1 MG tablet   Generic drug:  varenicline      TK 1 T PO BID        naproxen 500 MG tablet    NAPROSYN    30 tablet    Take 1 tablet (500 mg) by mouth 2 times daily as needed for moderate pain (with food)    Closed nondisplaced fracture of proximal phalanx of lesser toe of left foot, initial encounter

## 2017-08-23 NOTE — LETTER
[address] [address]  Phone: [phone]    August 23, 2017        Lorraine Painting  [address] [place], [place] [address]E [facility] [address]          To whom it may concern:    RE: Lorraine Painting    Patient was seen and treated today at our clinic.  Patient may return to work with the following:  No working or lifting restrictions    Please contact me for questions or concerns.      Sincerely,        Thang Do DPM

## 2018-03-04 ENCOUNTER — HEALTH MAINTENANCE LETTER (OUTPATIENT)
Age: 63
End: 2018-03-04

## 2020-02-23 ENCOUNTER — HEALTH MAINTENANCE LETTER (OUTPATIENT)
Age: 65
End: 2020-02-23

## 2020-12-13 ENCOUNTER — HEALTH MAINTENANCE LETTER (OUTPATIENT)
Age: 65
End: 2020-12-13

## 2021-01-01 ENCOUNTER — HEALTH MAINTENANCE LETTER (OUTPATIENT)
Age: 66
End: 2021-01-01

## 2021-04-17 ENCOUNTER — HEALTH MAINTENANCE LETTER (OUTPATIENT)
Age: 66
End: 2021-04-17

## 2022-01-01 ENCOUNTER — TELEPHONE (OUTPATIENT)
Dept: INTERVENTIONAL RADIOLOGY/VASCULAR | Facility: CLINIC | Age: 67
End: 2022-01-01
Payer: COMMERCIAL

## 2022-01-01 ENCOUNTER — MEDICAL CORRESPONDENCE (OUTPATIENT)
Dept: HEALTH INFORMATION MANAGEMENT | Facility: CLINIC | Age: 67
End: 2022-01-01
Payer: COMMERCIAL

## 2022-01-01 ENCOUNTER — VIRTUAL VISIT (OUTPATIENT)
Dept: ONCOLOGY | Facility: CLINIC | Age: 67
End: 2022-01-01
Attending: INTERNAL MEDICINE
Payer: COMMERCIAL

## 2022-01-01 ENCOUNTER — TRANSCRIBE ORDERS (OUTPATIENT)
Dept: OTHER | Age: 67
End: 2022-01-01
Payer: COMMERCIAL

## 2022-01-01 ENCOUNTER — HEALTH MAINTENANCE LETTER (OUTPATIENT)
Age: 67
End: 2022-01-01

## 2022-01-01 ENCOUNTER — PRE VISIT (OUTPATIENT)
Dept: GASTROENTEROLOGY | Facility: CLINIC | Age: 67
End: 2022-01-01
Payer: COMMERCIAL

## 2022-01-01 ENCOUNTER — VIRTUAL VISIT (OUTPATIENT)
Dept: GASTROENTEROLOGY | Facility: CLINIC | Age: 67
End: 2022-01-01
Attending: NURSE PRACTITIONER
Payer: COMMERCIAL

## 2022-01-01 ENCOUNTER — ANCILLARY PROCEDURE (OUTPATIENT)
Dept: NUCLEAR MEDICINE | Facility: CLINIC | Age: 67
End: 2022-01-01
Attending: INTERNAL MEDICINE
Payer: COMMERCIAL

## 2022-01-01 DIAGNOSIS — R16.0 LIVER MASS: ICD-10-CM

## 2022-01-01 DIAGNOSIS — R16.0 LIVER MASS: Primary | ICD-10-CM

## 2022-01-01 DIAGNOSIS — Z86.19 HISTORY OF HEPATITIS C: ICD-10-CM

## 2022-01-01 DIAGNOSIS — Z11.59 ENCOUNTER FOR SCREENING FOR OTHER VIRAL DISEASES: Primary | ICD-10-CM

## 2022-01-01 PROCEDURE — 99205 OFFICE O/P NEW HI 60 MIN: CPT | Mod: 95 | Performed by: INTERNAL MEDICINE

## 2022-01-01 PROCEDURE — 99417 PROLNG OP E/M EACH 15 MIN: CPT | Performed by: INTERNAL MEDICINE

## 2022-01-01 PROCEDURE — A9503 TC99M MEDRONATE: HCPCS | Performed by: RADIOLOGY

## 2022-01-01 PROCEDURE — 78306 BONE IMAGING WHOLE BODY: CPT | Mod: GC | Performed by: RADIOLOGY

## 2022-01-01 RX ORDER — TC 99M MEDRONATE 20 MG/10ML
20-30 INJECTION, POWDER, LYOPHILIZED, FOR SOLUTION INTRAVENOUS ONCE
Status: COMPLETED | OUTPATIENT
Start: 2022-01-01 | End: 2022-01-01

## 2022-01-01 RX ADMIN — TC 99M MEDRONATE 26 MCI.: 20 INJECTION, POWDER, LYOPHILIZED, FOR SOLUTION INTRAVENOUS at 09:05

## 2022-01-01 ASSESSMENT — PAIN SCALES - GENERAL: PAINLEVEL: MODERATE PAIN (4)

## 2022-05-10 NOTE — TELEPHONE ENCOUNTER
RECORDS RECEIVED FROM:    Appt Date: 05.23.2022   NOTES STATUS DETAILS   OFFICE NOTE from referring provider Received / CE 04.26.2022  Riana Miller NP   OFFICE NOTES from other specialists Care Everywhere  04.04.2022 Shyanne Lee     DISCHARGE SUMMARY from hospital Care Everywhere 04.02.2022 [facility]    MEDICATION LIST Care Everywhere    LIVER BIOSPY (IF APPLICABLE)      PATHOLOGY REPORTS      IMAGING     ENDOSCOPY (IF AVAILABLE)     COLONOSCOPY (IF AVAILABLE)     ULTRASOUND LIVER     CT OF ABDOMEN Care Everywhere 04.02.2022 CT Abd Pelvis [facility]   MRI OF LIVER Care Everywhere 04.11.2022 MR Abd [facility]   FIBROSCAN, US ELASTOGRAPHY, FIBROSIS SCAN, MR ELASTOGRAPHY     LABS     HEPATIC PANEL (LIVER PANEL) Care Everywhere 04.02.2022   BASIC METABOLIC PANEL Care Everywhere 04.02.2022   COMPLETE METABOLIC PANEL Care Everywhere 04.28.2022   COMPLETE BLOOD COUNT (CBC) Care Everywhere 04.28.2022   INTERNATIONAL NORMALIZED RATIO (INR) Care Everywhere 08.30.2019   HEPATITIS C ANTIBODY     HEPATITIS C VIRAL LOAD/PCR     HEPATITIS C GENOTYPE     HEPATITIS B SURFACE ANTIGEN Care Everywhere 09.24.2019   HEPATITIS B SURFACE ANTIBODY Care Everywhere 09.24.2019   HEPATITIS B DNA QUANT LEVEL     HEPATITIS B CORE ANTIBODY Care Everywhere 09.24.2019     Action 05.10.2022 RM   Action Taken Pending images      Action 05.11.2022 RM   Action Taken Called [facility] to request image done on 4/2/2022 CT called [phone] spoke to Ying who will be pushing over image, called  to request MR called [phone] was told to send fax request. Image received from [facility] and uploaded to chart. Pending MR..      Action 05.13.2022    Action Taken Images received and uploaded to [facility]

## 2022-05-23 NOTE — PROGRESS NOTES
[facility]  LIVER CLINIC CONSULTATION  VIDEO VISIT  REASON FOR CONSULTATION: hepatitis C, probable HCC  REFERRING PROVIDER: Deanna Eastlick NP [facility]    A/P  Lorraine Painting is a 66 Y F with 10 cm liver mass involving right PV. 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 Although her liver does not radiographically appear cirrhotic, she has thrombocytopenia. .    She has good performance status and is asymptomatic, except for some mild, intermittent abdominal discomfort which does not impair her quality of life, eating or activities. Plt 102, labs are otherwise normal.    Based on MRI read, options will likley be limited to systemic therapy. Will need CT chest and bone scan. Will review at tumor conference.     Carolina Mckay MD  Hepatology/Liver Transplant  Medical Director, Liver Transplantation  [facility]    Subjective  Lorraine Painting is a 66 Y F referred for hepatitis C and probable HCC.    HCV  First diagnosis 2019  Genotype 1a  Assessment of fibrosis: US elastography mod-severe fibrosis in 2020  History of treatment: Epclusa 12 weeks in 2020. HCV RNA neg 2022  Risk factors/duration of infection former  had HCV. She denies     HCC, probable  4/2/22 seen in ED for severe right-sided abdominal pain that was new that night. It woke her in the middle of the night. CT showed R hepatic lobe mass 10 cm. HCC/CCA were both in differential as was angiosarcoma.  . Abdominal pain is now mild at the top of her ribs and some toward her back.     MRI 4/11/22   1. Large mass involving the right hepatic lobe replaces much of segments 6 and 7 and involves segments 5 and 8 to a lesser extent. Apparent involvement and occlusion of the posterior division of the right portal vein. Acknowledging that the patient does not carry a clinical diagnosis of cirrhosis, and in the absence of known hepatic parenchymal disease, LIRADS criteria do not apply.  "Nevertheless, the mass shows features suggesting hepatocellular carcinoma as well as cholangiocarcinoma, possibly biphenotypic hepatocellular carcinoma/cholangiocarcinoma. Primary hepatic angiosarcoma is also a possibility. Correlation with serum AFP and CA 19-9 would be beneficial. Also a viral hepatitis panel could be considered. Ultimately, tissue sampling is recommended for definitive diagnosis.   2. No convincing extrahepatic disease is identified.   3. Additional chronic and incidental findings as described in the body of the report.    She asked about homeopathic or natural treatments.    ALT 21  AST 30  Tbili 0.9  Alk phos 177  INR  Albumin 4  Platelets 102  Creatinine 0.5    ETOH use: none    Past Medical History  Squamous cell ca vulva s/p svulvectomy, chemo and radiation.   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   Lives alone  Support: work friends, son  Family History  M d after a fall  F d age 38 \"bone cancer\"  2 sister  2 brothers 1  in a snowmobile accident  2 children  No liver disease in family  ROS 10 point ROS neg other than the symptoms noted above in the HPI.  Exam  Gen Alert pleasant NAD  Resp No difficulty breathing. No cough  Skin No Jaundice  Eyes No icterus  Neuro ROMERO  MSK no muscle wasting  Psyche Pleasant, appropriate. Well groomed.  Lorraine Painting is a 66 year old female who is being evaluated via a billable video visit.    Video-Visit Details  Type of service:  Video Visit  Video Start Time: 830  Video End Time: 917  Originating Location (pt. Location):home  Distant Location (provider location):  [facility] [place]      Platform used for Video Visit: [employer] or [employer]    Time today in minutes  Record review 20  Communication with care team 10  Face to face with patient on video 47  Documentation 20          "

## 2022-05-23 NOTE — LETTER
5/23/2022         RE: Lorraine Painting  [address] [place] [address]        Dear Colleague,    Thank you for referring your patient, Lorraine Painting, to the [facility] [place]. Please see a copy of my visit note below.    [facility]  LIVER CLINIC CONSULTATION  VIDEO VISIT  REASON FOR CONSULTATION: hepatitis C, probable HCC  REFERRING PROVIDER: Deanna Eastlick NP [facility]    A/P  Lorraine Painting is a 66 Y F with 10 cm liver mass involving right PV. 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 Although her liver does not radiographically appear cirrhotic, she has thrombocytopenia. .    She has good performance status and is asymptomatic, except for some mild, intermittent abdominal discomfort which does not impair her quality of life, eating or activities. Plt 102, labs are otherwise normal.    Based on MRI read, options will likley be limited to systemic therapy. Will need CT chest and bone scan. Will review at tumor conference.     Carolina Mckay MD  Hepatology/Liver Transplant  Medical Director, Liver Transplantation  [facility]    Subjective  Lorraine Painting is a 66 Y F referred for hepatitis C and probable HCC.    HCV  First diagnosis 2019  Genotype 1a  Assessment of fibrosis: US elastography mod-severe fibrosis in 2020  History of treatment: Epclusa 12 weeks in 2020. HCV RNA neg 2022  Risk factors/duration of infection former  had HCV. She denies     HCC, probable  4/2/22 seen in ED for severe right-sided abdominal pain that was new that night. It woke her in the middle of the night. CT showed R hepatic lobe mass 10 cm. HCC/CCA were both in differential as was angiosarcoma.  . Abdominal pain is now mild at the top of her ribs and some toward her back.     MRI 4/11/22   1. Large mass involving the right hepatic lobe replaces much of segments 6 and 7 and involves segments 5 and 8 to a lesser extent. Apparent  "involvement and occlusion of the posterior division of the right portal vein. Acknowledging that the patient does not carry a clinical diagnosis of cirrhosis, and in the absence of known hepatic parenchymal disease, LIRADS criteria do not apply. Nevertheless, the mass shows features suggesting hepatocellular carcinoma as well as cholangiocarcinoma, possibly biphenotypic hepatocellular carcinoma/cholangiocarcinoma. Primary hepatic angiosarcoma is also a possibility. Correlation with serum AFP and CA 19-9 would be beneficial. Also a viral hepatitis panel could be considered. Ultimately, tissue sampling is recommended for definitive diagnosis.   2. No convincing extrahepatic disease is identified.   3. Additional chronic and incidental findings as described in the body of the report.    She asked about homeopathic or natural treatments.    ALT 21  AST 30  Tbili 0.9  Alk phos 177  INR  Albumin 4  Platelets 102  Creatinine 0.5    ETOH use: none    Past Medical History  Squamous cell ca vulva s/p svulvectomy, chemo and radiation.   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   Lives alone  Support: work friends, son  Family History  M d after a fall  F d age 38 \"bone cancer\"  2 sister  2 brothers 1  in a snowmobile accident  2 children  No liver disease in family  ROS 10 point ROS neg other than the symptoms noted above in the HPI.  Exam  Gen Alert pleasant NAD  Resp No difficulty breathing. No cough  Skin No Jaundice  Eyes No icterus  Neuro ROMERO  MSK no muscle wasting  Psyche Pleasant, appropriate. Well groomed.  Lorraine Painting is a 66 year old female who is being evaluated via a billable video visit.    Video-Visit Details  Type of service:  Video Visit  Video Start Time: 830  Video End Time: 917  Originating Location (pt. Location):home  Distant Location (provider location):  [facility] [place]      Platform used for " Video Visit: [employer] or [employer]    Time today in minutes  Record review 20  Communication with care team 10  Face to face with patient on video 47  Documentation 20          Again, thank you for allowing me to participate in the care of your patient.        Sincerely,        Carolina Mckay MD

## 2022-05-23 NOTE — PROGRESS NOTES
"Left voicemail for patient to call back to set up telemedicine visit, will call again before appointment time.  Marysol Carranza, CMA on 5/23/2022 at 8:17 AM    Lorraine is a 66 year old who is being evaluated via a billable video visit.      How would you like to obtain your AVS? MyChart  If the video visit is dropped, the invitation should be resent by: Text to cell phone: [phone]  Will anyone else be joining your video visit? No  {If patient encounters technical issues they should call [phone] :[id number]}    Video Start Time: {video visit start/end time for provider to select:033220}  Video-Visit Details    Type of service:  Video Visit    Video End Time:{video visit start/end time for provider to select:805575}    Originating Location (pt. Location): {video visit patient location:749333::\"Home\"}    Distant Location (provider location):  [facility] [place]     Platform used for Video Visit: [employer]      "

## 2022-05-31 NOTE — TELEPHONE ENCOUNTER
I called and gave Lorraine the IR scheduling number to reschedule her biopsy appt.  NAZANIN Chapin, RN, BSN  Interventional Radiology Nurse Coordinator   Phone:  [phone]

## 2022-05-31 NOTE — TELEPHONE ENCOUNTER
[facility] Call Center    Phone Message    May a detailed message be left on voicemail: yes     Reason for Call: Other: Pt called and said that 6/03 will not work and will need to r/s it. Please call her back. Thanks     Action Taken: Message routed to:  [facility] GUS    Travel Screening: Not Applicable

## 2022-05-31 NOTE — PROGRESS NOTES
Outpatient IR Biopsy Referral    Patient is a 67 y/o female with a PMH of COPD, prior tobacco use, 1m cm liver mass, HCC/CCA, angiosarcoma, Hep C, thrombocytopenia. IR has been asked to biopsy a right hepatic lobe mass.     4/11/22 [facility] IMPRESSION:   1. Large mass involving the right hepatic lobe replaces much of segments 6 and 7 and involves segments 5 and 8 to a lesser extent. Apparent involvement and occlusion of the posterior division of the right portal vein. Acknowledging that the patient does not carry a clinical diagnosis of cirrhosis, and in the absence of known hepatic parenchymal disease, LIRADS criteria do not apply. Nevertheless, the mass shows features suggesting hepatocellular carcinoma as well as cholangiocarcinoma, possibly biphenotypic hepatocellular carcinoma/cholangiocarcinoma. Primary hepatic angiosarcoma is also a possibility. Correlation with serum AFP and CA 19-9 would be beneficial. Also a viral hepatitis panel could be considered. Ultimately, tissue sampling is recommended for definitive diagnosis.   2. No convincing extrahepatic disease is identified.   3. Additional chronic and incidental findings as described in the body of the report.      4/2/22 [facility] CT IMPRESSION:   1.  Large heterogeneous somewhat masslike process in the right hepatic lobe posteriorly measuring up to at least 10 cm. Margins are somewhat ill-defined. Findings are most concerning for underlying malignancy. MRI is recommended for initial further evaluation. 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   2.  No acute abnormalities or CT findings to explain the patient's symptoms elsewhere. Normal appendix.   3.  Nonobstructing stones left kidney measuring up to 3 mm in diameter. No hydronephrosis.   4.  PO hysterectomy      HEPATOBILIARY: There is a heterogeneous somewhat masslike process occupying a large portion of the right hepatic lobe posteriorly measuring up to at least 10 cm in  diameter. This is indeterminate but concerning for underlying neoplastic process. Infection accounting for this appearance felt to be less likely but not completely excluded. MRI of the liver is recommended for further evaluation. Right portal vein within the liver appears occluded. No biliary dilatation. Gallbladder is absent.       Case and imaging was reviewed by Dr. Waite from IR at tumor board who called schedulers to add on this case.     Procedure order, surgical pathology orders placed.    MELANIE Erazo CNP  Interventional Radiology   IR on-call pager: [phone]

## 2022-06-09 NOTE — PROGRESS NOTES
Lorraine is a 66 year old who is being evaluated via a billable video visit.      How would you like to obtain your AVS? MyChart  If the video visit is dropped, the invitation should be resent by: Text to cell phone: [phone]  Will anyone else be joining your video visit? Ayanna       Cyrus Valentin VF    Video Start Time: 11:11 AM  Video-Visit Details    Type of service:  Video Visit    Video End Time:11:36 AM    Originating Location (pt. Location): Home    Distant Location (provider location):  [facility]     Platform used for Video Visit: [employer]    Oncology initial visit:  Date on this visit: 6/9/2022    Lorraine Painting  is referred by Dr.Julie Afua Juan* for an oncology consultation. She requires evaluation for new diagnosis of liver mass.    Primary Physician: No Ref-Primary, Physician     History Of Present Illness:  Ms. Painting is a 66 year old female who presents with new diagnosis of liver mass.    This is a video visit.  I also reviewed notes from Dr. Mckay from hepatology.    Dotatate has a history of hepatitis C, genotype Ia which was first diagnosed in 2019.  She was treated with Epclusa in 2020 and achieved sustained virologic response.    On 4/2/2022 she presented with right-sided abdominal pain.  Work-up at that time showed a right hepatic lobe mass about 10 cm in size.     This was concerning for HCC or biphenotypic HCC/cholangiocarcinoma versus angiosarcoma. She had an MRI on 4/11/2022 which showed a large mass replacing much of segment 6 and 7 and involving segments 5 and 8.  It measures approximately 7.6 x 9.8 cm.  It shows heterogenous arterial phase hyperenhancement with areas of progressive enhancement related washout. There is intermediate tumoral signal on T2WI with small fluid intensity cystic spaces. Associated diffusion restriction. The mass abuts and distorts the right hepatic vein, and the posterior division of the right portal vein appears to be  occluded. The satellite lesions extending anteriorly to involve segments 5 and 8.  This mass shows features of hepatocellular carcinoma but she does not have cirrhosis so LIRADS criteria do not apply.  Spleen is not enlarged.  No upper abdominal lymphadenopathy.  Lung bases appear clear.  Alpha-fetoprotein was 420.  CA 19-9 was normal.    bone scan was negative for any evidence of metastatic disease on 6/6/2022  CT chest showed scattered sub-4 mm lung nodules were seen.  It also showed left thyroid lobe nodule for which thyroid ultrasound was recommended and this has been ordered by Dr. Mckay.     There is plan to do liver biopsy to determine the definitive diagnosis.      She has been noticing abdominal discomfort/bloating sensation over the last couple of weeks.  The pain is not very bad.  She takes over-the-counter pain medication and tells me that she does not require any stronger pain medication right now.  She sometimes has constipation and sometimes has diarrhea.  No nausea or vomiting.  She does not believe that she has lost weight.  She feels a little tired but remains decently functional.  She has mild dyspnea on exertion.  No neuropathy.  No bleeding.  No new swellings.  She mentions that she is planning to transfer her care to [facility].  Currently the biopsy is a scheduled here for 6/13/2022.        ECOG 1    ROS:  A comprehensive ROS was otherwise neg      Past Medical/Surgical History:  No past medical history on file.  Past Surgical History:   Procedure Laterality Date     FOOT SURGERY Left    HPV related vulva cancer in 2019-  1. 07/02/2019: anterior radical vulvectomy, distal urethral resection and bilateral inguinofemoral lymph node dissection per Dr. Hargrove (gyn onc) and Dr. Louis (urology)     2. (09/17/2019 - 10/31/2019) - Initiated concurrent chemo-radiation with weekly cisplatin.  Completed 6 cycles weekly cisplatin on 10/29/2019       EBRT of 5040 cGy to the pelvic lymph nodes and  vulva with additional 1080 cGy boost to the urethra without bolus was done, for a total dose delivered to the urethra of 6120 cGy.    Cancer History:   As above    Allergies:  Allergies as of 06/09/2022 - Reviewed 06/09/2022   Allergen Reaction Noted     New medication allergy  11/29/2010     Venlafaxine Other (See Comments) 08/15/2015     Current Medications:  Current Outpatient Medications   Medication Sig Dispense Refill     naproxen (NAPROSYN) 500 MG tablet Take 1 tablet (500 mg) by mouth 2 times daily as needed for moderate pain (with food) (Patient not taking: No sig reported) 30 tablet 1      Family History:  No family history on file.   Dad had bone cancer  Sister with Uterine cancer  Paternal aunt with breast cancer  Social History:  Social History     Socioeconomic History     Marital status:      Spouse name: Not on file     Number of children: Not on file     Years of education: Not on file     Highest education level: Not on file   Occupational History     Not on file   Tobacco Use     Smoking status: Former Smoker     Packs/day: 1.00     Years: 30.00     Pack years: 30.00     Types: Cigarettes     Smokeless tobacco: Never Used   Substance and Sexual Activity     Alcohol use: No     Drug use: Yes     Comment: marijuana as a teen      Sexual activity: Not Currently   Other Topics Concern     Parent/sibling w/ CABG, MI or angioplasty before 65F 55M? Not Asked   Social History Narrative     Not on file     Social Determinants of Health     Financial Resource Strain: Not on file   Food Insecurity: Not on file   Transportation Needs: Not on file   Physical Activity: Not on file   Stress: Not on file   Social Connections: Not on file   Intimate Partner Violence: Not on file   Housing Stability: Not on file     Has been a chronic smoker but quit 2 months ago. Very rare etoh use.      Physical Exam:  There were no vitals taken for this visit.   Wt Readings from Last 4 Encounters:   07/26/17 63 kg (139  lb)   07/12/17 63 kg (139 lb)   07/11/17 63 kg (139 lb)   11/29/10 56.6 kg (124 lb 12.8 oz)         Constitutional.  Does not seem to be in any acute distress.  Eyes.  No redness or discharge noted.  Respiratory.  Speaking in full sentences.  Breathing seems comfortable without any accessory use of muscles.    Skin.  Visualized his skin does not show any obvious rashes.  Musculoskeletal.  Range of motion for visualized areas is intact.  Neurological.  Alert and oriented x3.  Psychiatric.  Mood, mentation and affect are normal.  Decision making capacity is intact.      The rest of a comprehensive physical examination is deferred due to Public Health Emergency video visit restrictions.    Laboratory/Imaging Studies      Reviewed  4/28/2022  CBC shows hemoglobin 14.  WBC 6.4.  Platelets 102.  CMP showed normal kidney function.  Alkaline phosphatase 177.  Bilirubin 0.9.  Normal AST and ALT.    Alpha-fetoprotein 420.  CA 19-9 was normal.    Reviewed imaging and mentioned above.    ASSESSMENT/PLAN:    She has a large liver mass measuring about 10 cm replacing much of segment 6 and 7 and involving segments 5 and 8.  This is likely HCC or biphenotypic HCC/cholangiocarcinoma versus angiosarcoma.   She has a history of hepatitis C but she does not have a history of cirrhosis.  No evidence of metastatic disease on bone scan or CT chest although there is some very tiny lung nodules.  These are indeterminate.      We discussed the situation in detail.  We need a tissue diagnosis to confirm what exactly we are dealing with.  The treatment would depend upon the final diagnosis.  Currently her biopsy is a scheduled for 6/13/2022 but she has decided to transfer her care to [facility].  She wants me to cancel the biopsy here.  We discussed that in case she notices worsening pain or abdominal distention, then she should go to the hospital right away.  She understands that.  I will send a message to cancel the biopsy as per patient  request.      Lung nodule.  These are very tiny and indeterminate.  These will need serial monitoring.    Going forward she will follow at [facility].  I wished her all the best.    All of her questions were answered to her satisfaction.  She knows to contact me if she has any questions.        Joann Rodriguez MD

## 2022-06-10 NOTE — PROGRESS NOTES
Outpatient IR Biopsy Referral    Referring team has notified IR that patient is transferring care to [place]. Patient requests scheduled biopsy 6/13/22 be cancelled.     MELANIE Erazo CNP  Interventional Radiology   IR on-call pager: [phone]